# Patient Record
Sex: FEMALE | Race: WHITE | Employment: PART TIME | URBAN - METROPOLITAN AREA
[De-identification: names, ages, dates, MRNs, and addresses within clinical notes are randomized per-mention and may not be internally consistent; named-entity substitution may affect disease eponyms.]

---

## 2019-10-10 ENCOUNTER — HOSPITAL ENCOUNTER (OUTPATIENT)
Dept: PHYSICAL THERAPY | Age: 64
Discharge: HOME | End: 2019-10-10
Payer: MEDICARE

## 2019-10-10 PROCEDURE — 97161 PT EVAL LOW COMPLEX 20 MIN: CPT | Performed by: PHYSICAL THERAPIST

## 2019-10-10 PROCEDURE — 97110 THERAPEUTIC EXERCISES: CPT | Performed by: PHYSICAL THERAPIST

## 2019-10-10 NOTE — PROGRESS NOTES
[facility] at [address] [place] [address] Drive: [phone]   Fax: [phone]  PLAN OF CARE / STATEMENT OF MEDICAL NECESSITY FOR PHYSICAL THERAPY SERVICES  Patient Name: Marely Webb : 1955   Medical   Diagnosis: Left hip pain [M25.552] Treatment Diagnosis: Left hip pain, low back pain, left foot pain   Onset Date: chronic     Referral Source: Debride, Beryle Kurk, FNP Start of Care [facility]): 10/10/2019   Prior Hospitalization: See medical history Provider #: [id number]   Prior Level of Function: Gardening, dog walking, independent w/ ADLs   Comorbidities: OA, asthma, hypothyroidism   Medications: Verified on Patient Summary List   The Plan of Care and following information is based on the information from the initial evaluation.   ===========================================================================================  Assessment / garcia information:  Marely Webb is a 71 y.o.  yo female presents with signs/symptosm consistent with low back pain and muscular guarding and spasm. Responds well to repeated movement for reduction in symptoms     Patient will continue to benefit from skilled PT services to modify and progress therapeutic interventions, address functional mobility deficits, address ROM deficits, address strength deficits, analyze and address soft tissue restrictions, analyze and cue movement patterns, analyze and modify body mechanics/ergonomics and assess and modify postural abnormalities to attain remaining goals. .    Pt instructed in HEP and will f/u in clinic for PT.  ===========================================================================================  Eval Complexity: History HIGH Complexity :3+ comorbidities / personal factors will impact the outcome/ POC ;  Examination  MEDIUM Complexity : 3 Standardized tests and measures addressing body structure, function, activity limitation and / or participation in recreation ; Presentation LOW Complexity : Stable, uncomplicated ;  Decision Making MEDIUM Complexity : FOTO score of 26-74; Overall Complexity LOW   Problem List: pain affecting function, decrease ROM, decrease strength, edema affecting function, decrease ADL/ functional abilitiies, decrease activity tolerance, decrease flexibility/ joint mobility and decrease transfer abilities   Treatment Plan may include any combination of the following: Therapeutic exercise, Therapeutic activities, Neuromuscular re-education, Physical agent/modality, Gait/balance training, Manual therapy, Patient education, Self Care training and Functional mobility training  Patient / Family readiness to learn indicated by: asking questions, trying to perform skills and interest  Persons(s) to be included in education: patient (P)  Barriers to Learning/Limitations: no  Measures Taken: NA  Patient Goal (s): Pain relief   Patient self reported health status: good  Rehabilitation Potential: fair  Goals:  Short Term Goals: To be accomplished in 2 weeks:   Patient will report compliance with HEP 1x/day to aid in rehabilitation program.   Status at IE: NA   Current: Same as IE      Patient will increase lumbar ROM to 100 in all planes to aid in completion of ADLs. Status at IE:75%   Current: Same as IE    Long Term Goals: To be accomplished in 4 weeks:   Patient will increase B LE strength to 5/5 MMT throughout to aid in completion of ADLs. Status at IE:4/5   Current: Same as IE     Patient will report pain no greater than 4/10 throughout entire day to aid in completion of ADLs. Status at IE:4-10/10   Current: Same as IE     Patent will be able to sit for 30mins to be able to drive to appointments and complete ADLs. Status at [facility] with sitting for more than several mins   Current: Same as IE     Patient will improve FOTO score to 67 points to demonstrate improvement in functional status.    Status at IE:58   Current: Same as IE      Frequency / Duration:   Patient to be seen 2  times per week for 6  weeks:  Patient / Caregiver education and instruction: self care and exercises      . Therapist Signature: Esperanza Ganser, DPT, CRISTIAN Date: 12/94/2785   Certification Period: 10/10/2019 - 12/5/2019 Time: 9:16 AM   ===========================================================================================  I certify that the above Physical Therapy Services are being furnished while the patient is under my care. I agree with the treatment plan and certify that this therapy is necessary. Physician Signature:        Date:       Time:     Please sign and return to [facility] or you may fax the signed copy to [phone]. Thank you.

## 2019-10-10 NOTE — PROGRESS NOTES
PT DAILY TREATMENT NOTE/LUMBAR EVAL 10-18    Patient Name: Silas Vera  Date:10/10/2019  : 1955  [x]  Patient  Verified  Payor: [place] MEDICARE / Plan: VA MEDICARE PART A & B / Product Type: Medicare /    In time:8:00  Out time:9:00  Total Treatment Time (min): 60  Visit #: 1 of 12    Medicare/BCBS Only   Total Timed Codes (min):  30 1:1 Treatment Time:  60     Treatment Area: Low back pain [M54.5]  SUBJECTIVE  Pain Level (0-10 scale): 4  []constant []intermittent []improving []worsening []no change since onset    Any medication changes, allergies to medications, adverse drug reactions, diagnosis change, or new procedure performed?: [x] No    [] Yes (see summary sheet for update)  Subjective functional status/changes:     Patient has c/c of left hip pain since this year, along with left foot pain. Feels like it could be related to her scoliosis. MELODY: uncertain. Patient describes pain as shooting, and stiff. No diurnal features. Denies numbness/tingling. Denies popping/clicking. Aggravating factors: uncertain. Alleviating factors: uncertain. Denies red flags: SOB, chest pain, dizziness/lightheadedness, blurred/double vision, HA, chills/fevers, night sweats, change in bowel/bladder control, abdominal pain, difficulty swallowing, slurred speech, unexplained weight gain/loss, nausea, vomiting. PMHx: OA, asthma, scoliosis, hypothyroidism. Surgical Hx: right SAD/DCR/biceps tenodesis. Social Hx: 3 VICENTE home, denies alcohol &tobacco, work status:; lifting books to [employer] PLOF: gardening, dogs, reading, walking. CLOF: same. Diagnostic Imaging: X-ray: unremarkable in left hip and left foot.        OBJECTIVE/EXAMINATION     min [x]Eval                  []Re-Eval       30 min Therapeutic Exercise:  [] See flow sheet :   Rationale: increase ROM, increase strength and decrease pain to improve the patients ability to complete ADLs            With   [] TE   [] TA   [] neuro   [] other: Patient Education: [x] Review HEP    [] Progressed/Changed HEP based on:   [] positioning   [] body mechanics   [] transfers   [] heat/ice application    [] other:      Physical Therapy Evaluation - Lumbar Spine  :    OBJECTIVE  Posture:  Lateral Shift: [] R    [] L     [] +  [x] -  Kyphosis: [] Increased [] Decreased   [x]  WNL  Lordosis:  [] Increased [] Decreased   [x] WNL  Pelvic symmetry: [x] WNL    [] Other:    Gait:  [] Normal     [] Abnormal:    Active Movements: [] N/A   [] Too acute   [] Other:  ROM % AROM Comments:pain, area   Forward flexion 40-60 100    Extension 20-30 75    SB right 20-30 75    SB left 20-30 75    Rotation right 5-10 75    Rotation left 5-10 75      Repeated Movements   Effects on present pain: produces (NM), abolishes (A), increases (incr), decreases (decr), centralizes (C), peripheral (PH), no effect (NE)   Pre-Test Sx Flexion Repeated Flexion Extension Repeated Extension Repeated SBL Repeated SBR   Sitting          Standing none NE NE NE NE     Lying      N/A N/A   Comments:some pain during transitions, but no residual increase in symptoms  Side [place]: NE  Sustained passive positioning test:    Neuro Screen [] WNL  Myotome/Dermatome/Reflexes:  Comments:    Dural Mobility:  SLR Supine: [] R    [] L    [] +    [x] -  @ (degrees):   Slump Test: [] R    [] L    [] +    [x] -  @ (degrees):   Prone Knee Bend: [] R    [] L    [] +    [] -     Palpation  [] Min  [] Mod  [] Severe    Location:  [] Min  [] Mod  [] Severe    Location:    Strength   L(0-5) R (0-5) N/T   Hip Flexion (L1,2) 4 4 []   Knee Extension (L3,4) 5 4p! []   Ankle Dorsiflexion (L4) 4 5 []   Great Toe Extension (L5)   []   Ankle Plantarflexion (S1)   []   Knee Flexion (S1,2) 4+ 4+ []   Abdominals 4 4 []   Paraspinals 4 44 []   Back Rotators 4 4 []   Gluteus Andre   []   Hip Abduction 4 4+ []         Special Tests  Lumbar:  Lumb.  Compression: [] Pos  [] Neg               Lumbar Distraction:   [] Pos  [] Neg    Quadrant:  [] Pos  [] Neg   [] Flex  [] Ext    Sacroilliac:  Gaenslen's: [] R    [] L    [] +    [x] -     Compression: [] +    [] -     Gapping:  [] +    [] -     Thigh Thrust: [] R    [] L    [] +    [x] -            Hip: Macdonald Amparo:  [] R    [] L    [] +    [x] -     Scour:  [] R    [] L    [] +    [] -     Piriformis: [] R    [] L    [] +    [] -     Other tests/comments:  Pain reduced with repeated hip shifts in supine and repeated supine<>sit       Pain Level (0-10 scale) post treatment: 2    ASSESSMENT/Changes in Function: Patient presents with signs/symptoms consistent with low back pain and muscular guarding and spasm. Responds well to repeated movement for reduction in symptoms    Patient will continue to benefit from skilled PT services to modify and progress therapeutic interventions, address functional mobility deficits, address ROM deficits, address strength deficits, analyze and address soft tissue restrictions, analyze and cue movement patterns, analyze and modify body mechanics/ergonomics and assess and modify postural abnormalities to attain remaining goals.      [x]  See Plan of Care  []  See progress note/recertification  []  See Discharge Summary         Progress towards goals / Updated goals:  See POC    PLAN  []  Upgrade activities as tolerated     [x]  Continue plan of care  []  Update interventions per flow sheet       []  Discharge due to:_  []  Other:_      Galina Betancourt PT, DPT 10/10/2019  8:12 AM

## 2019-10-17 ENCOUNTER — HOSPITAL ENCOUNTER (OUTPATIENT)
Dept: PHYSICAL THERAPY | Age: 64
Discharge: HOME | End: 2019-10-17
Payer: MEDICARE

## 2019-10-17 PROCEDURE — 97110 THERAPEUTIC EXERCISES: CPT | Performed by: PHYSICAL THERAPIST

## 2019-10-17 NOTE — PROGRESS NOTES
PT DAILY TREATMENT NOTE    Patient Name: Liss Kramer  Date:10/17/2019  : 1955  [x]  Patient  Verified  Payor: Sherren Dallas / Plan: VA MEDICARE PART A & B / Product Type: Medicare /    In time:1:30  Out time:2:02  Total Treatment Time (min): 32  Total Timed Codes (min): 32  1:1 Treatment Time ( only): 32   Visit #: 2 of 12    Treatment Area: Left hip pain [M25.552]  Low back pain [M54.5]  Left foot pain [M79.672]    SUBJECTIVE  Pain Level (0-10 scale): 0  Any medication changes, allergies to medications, adverse drug reactions, diagnosis change, or new procedure performed?: [x] No    [] Yes (see summary sheet for update)  Subjective functional status/changes:   [] No changes reported  Feels okay. The hip shifts have been intermittently helpful. OBJECTIVE      32 min Therapeutic Exercise:  [x] See flow sheet :   Rationale: increase ROM, increase strength and decrease pain to improve the patients ability to complete ADLs    With   [] TE   [] TA   [] neuro   [] other: Patient Education: [x] Review HEP    [] Progressed/Changed HEP based on:   [] positioning   [] body mechanics   [] transfers   [] heat/ice application    [] other:      Other Objective/Functional Measures: NA     Pain Level (0-10 scale) post treatment: 0    ASSESSMENT/Changes in Function: Patient responds well to treatment session. Patient is progressing well. Requires VC for correct form with hip shifts.  . No adverse effects were noted from today's treatment session       Patient will continue to benefit from skilled PT services to modify and progress therapeutic interventions, address functional mobility deficits, address ROM deficits, address strength deficits, analyze and address soft tissue restrictions, analyze and cue movement patterns, analyze and modify body mechanics/ergonomics, assess and modify postural abnormalities    []  See Plan of Care  []  See progress note/recertification  []  See Discharge Summary         Progress towards goals / Updated goals:  Short Term Goals: To be accomplished in 2 weeks:                   Patient will report compliance with HEP 1x/day to aid in rehabilitation program.                   Status at IE: NA                   Current: Reports compliance, 10//17/2019                      Patient will increase lumbar ROM to 100 in all planes to aid in completion of ADLs. Status at IE:75%                   Current: Same as IE     Long Term Goals: To be accomplished in 4 weeks:                   Patient will increase B LE strength to 5/5 MMT throughout to aid in completion of ADLs. Status at IE:4/5                   Current: Same as IE                      Patient will report pain no greater than 4/10 throughout entire day to aid in completion of ADLs. Status at IE:4-10/10                   Current: Same as IE                      Patent will be able to sit for 30mins to be able to drive to appointments and complete ADLs. Status at [address] [place] [address] with sitting for more than several mins                   Current: Same as IE                      Patient will improve FOTO score to 67 points to demonstrate improvement in functional status.                    Status at IE:58                   Current: Same as IE    PLAN  []  Upgrade activities as tolerated     [x]  Continue plan of care  []  Update interventions per flow sheet       []  Discharge due to:_  []  Other:_      Dakota Clayton PT, DPT 10/17/2019  1:42 PM    Future Appointments   Date Time Provider Shruti Sarmiento   10/18/2019  2:00 PM AUGUSTUS Fierro [facility]   10/22/2019  9:30 AM AUGUSTUS Fierro [facility]   10/29/2019  3:00 PM Luanne Fierro [facility]   11/1/2019  3:00 PM Josie Gibbons PT, DPT [facility]   11/4/2019  3:00 PM Josie Gibbons PT, NAVID GONZALEZHPTDESI [facility]   11/7/2019  3:30 PM Josie Gibbons PT, MARYT [facility]

## 2019-10-18 ENCOUNTER — HOSPITAL ENCOUNTER (OUTPATIENT)
Dept: PHYSICAL THERAPY | Age: 64
Discharge: HOME | End: 2019-10-18
Payer: MEDICARE

## 2019-10-18 PROCEDURE — 97110 THERAPEUTIC EXERCISES: CPT

## 2019-10-18 NOTE — PROGRESS NOTES
PT DAILY TREATMENT NOTE    Patient Name: Marquis Dove  Date:10/18/2019  : 1955  [x]  Patient  Verified  Payor: Gustabo Gonzalez / Plan: VA MEDICARE PART A & B / Product Type: Medicare /    In time: 155  Out time: 235  Total Treatment Time (min): 40  Total Timed Codes (min): 40  1:1 Treatment Time (MC only): 40   Visit #: 3 of 12    Treatment Area: Left hip pain [M25.552]  Low back pain [M54.5]  Left foot pain [M79.672]    SUBJECTIVE  Pain Level (0-10 scale): 3  Any medication changes, allergies to medications, adverse drug reactions, diagnosis change, or new procedure performed?: [x] No    [] Yes (see summary sheet for update)  Subjective functional status/changes:   [] No changes reported  \"I pretty much always have pain, but it is pretty mild today. \"    OBJECTIVE    40 min Therapeutic Exercise:  [x] See flow sheet :   Rationale: increase ROM, increase strength and decrease pain to improve the patients ability to complete ADLs       With   [] TE   [] TA   [] neuro   [] other: Patient Education: [x] Review HEP    [] Progressed/Changed HEP based on:   [] positioning   [] body mechanics   [] transfers   [] heat/ice application    [] other:      Other Objective/Functional Measures: NA     Pain Level (0-10 scale) post treatment: 2    ASSESSMENT/Changes in Function: Patient responds well to treatment session. Patient with good tolerance for all exercises. Provided green theraband for use in HEP. . No adverse effects were noted from today's treatment session. Patient will continue to benefit from skilled PT services to modify and progress therapeutic interventions, address functional mobility deficits, address ROM deficits, address strength deficits, analyze and address soft tissue restrictions, analyze and cue movement patterns, analyze and modify body mechanics/ergonomics, assess and modify postural abnormalities,  and instruct in home and community integration to attain remaining goals.     []  See Plan of Care  []  See progress note/recertification  []  See Discharge Summary         Progress towards goals / Updated goals:  Short Term Goals: To be accomplished in 2 weeks:                   [id number] will report compliance with HEP 1x/day to aid in rehabilitation program.                   Status at IE: NA                   Current: Reports compliance, 10//17/2019                      Patient will increase lumbar ROM to 100 in all planes to aid in completion of ADLs.                  Status at IE:75%                   Current: Same as IE     Long Term Goals: To be accomplished in 4 weeks:                   Patient will increase B LE strength to 5/5 MMT throughout to aid in completion of ADLs.                  Status at IE:4/5                   Current: Same as IE                      Patient will report pain no greater than 4/10 throughout entire day to aid in completion of ADLs.                  Status at IE:4-10/10                   Current: Same as IE                      Patent will be able to sit for 30mins to be able to drive to appointments and complete ADLs.                  Status at [address] [place] [address] with sitting for more than several mins                   Current: Same as IE                      Patient will improve FOTO score to 67 points to demonstrate improvement in functional status.                    Status at IE:58                   Current: Same as IE    PLAN  []  Upgrade activities as tolerated     [x]  Continue plan of care  []  Update interventions per flow sheet       []  Discharge due to:_  []  Other:_      Araceli Higuera PT, DPT 10/18/2019  1:56 PM    Future Appointments   Date Time Provider Shruti Sarmiento   10/18/2019  2:00 PM AUGUSTUS Self [facility]   10/22/2019  9:30 AM AUGUSTUS Self [facility]   10/29/2019  3:00 PM Luanne Self [facility]   11/1/2019  3:00 PM Gamaliel Peterson, PT, DPT [facility]   11/4/2019  3:00 PM Gamaliel Peterson, PT, DPT [facility]   11/7/2019  3:30 PM Shweta Harrison Juan Espino, DPT [facility]

## 2019-10-22 ENCOUNTER — HOSPITAL ENCOUNTER (OUTPATIENT)
Dept: PHYSICAL THERAPY | Age: 64
Discharge: HOME | End: 2019-10-22
Payer: MEDICARE

## 2019-10-22 PROCEDURE — 97110 THERAPEUTIC EXERCISES: CPT | Performed by: PHYSICAL THERAPIST

## 2019-10-22 PROCEDURE — 97110 THERAPEUTIC EXERCISES: CPT

## 2019-10-22 PROCEDURE — 97161 PT EVAL LOW COMPLEX 20 MIN: CPT

## 2019-10-22 NOTE — PROGRESS NOTES
PT DAILY TREATMENT NOTE    Patient Name: Ena Smith  Date:10/22/2019  : 1955  [x]  Patient  Verified  Payor: Tamie Saul / Plan: VA MEDICARE PART A & B / Product Type: Medicare /    In time: 672  Out time: 1010  Total Treatment Time (min): 45  Total Timed Codes (min): 40  1:1 Treatment Time ( only): 40   Visit #: 4 of 12    Treatment Area: Left hip pain [M25.552]  Low back pain [M54.5]  Left foot pain [M79.672]    SUBJECTIVE  Pain Level (0-10 scale): 1  Any medication changes, allergies to medications, adverse drug reactions, diagnosis change, or new procedure performed?: [x] No    [] Yes (see summary sheet for update)  Subjective functional status/changes:   [] No changes reported  \"I am feeling better. The pain is coming down and I can do more around the house. \"    OBJECTIVE    40 min Therapeutic Exercise:  [x] See flow sheet :   Rationale: increase ROM, increase strength and decrease pain to improve the patients ability to complete ADLs     With   [] TE   [] TA   [] neuro   [] other: Patient Education: [x] Review HEP    [] Progressed/Changed HEP based on:   [] positioning   [] body mechanics   [] transfers   [] heat/ice application    [] other:      Other Objective/Functional Measures: NA     Pain Level (0-10 scale) post treatment: 0-1    ASSESSMENT/Changes in Function: Patient responds well to treatment session. Patient noting improved tolerance for activities at home with reduction in pain intensity. No adverse effects were noted from today's treatment session.      Patient will continue to benefit from skilled PT services to modify and progress therapeutic interventions, address functional mobility deficits, address ROM deficits, address strength deficits, analyze and address soft tissue restrictions, analyze and cue movement patterns, analyze and modify body mechanics/ergonomics, assess and modify postural abnormalities,  and instruct in home and community integration to attain remaining goals.    []  See Plan of Care  []  See progress note/recertification  []  See Discharge Summary         Progress towards goals / Updated goals:  Short Term Goals: To be accomplished in 2 weeks:                   VZGSYUK will report compliance with HEP 1x/day to aid in rehabilitation program.                   Status at IE: NA                   Current: Reports compliance, 10//17/2019                      Patient will increase lumbar ROM to 100 in all planes to aid in completion of ADLs.                  Status at IE:75%                   Current: Same as IE     Long Term Goals: To be accomplished in 4 weeks:                   Patient will increase B LE strength to 5/5 MMT throughout to aid in completion of ADLs.                  Status at IE:4/5                   Current: Same as IE                      Patient will report pain no greater than 4/10 throughout entire day to aid in completion of ADLs.                  Status at IE:4-10/10                   Current: Pain currently 1-4/10 past day  10/22/19                      Patent will be able to sit for 30mins to be able to drive to appointments and complete ADLs.                  Status at [facility] with sitting for more than several mins                   Current: Same as IE                      Patient will improve FOTO score to 67 points to demonstrate improvement in functional status.                    Status at IE:58                   Current: Same as IE    PLAN  []  Upgrade activities as tolerated     [x]  Continue plan of care  []  Update interventions per flow sheet       []  Discharge due to:_  []  Other:_      Arcadio Kate PT, DPT 10/22/2019  9:37 AM    Future Appointments   Date Time Provider Shruti Sarmiento   10/29/2019  3:00 PM Luanne Temple [facility]   11/1/2019  3:00 PM Meredith Nicole PT, DPT [facility]   11/4/2019  3:00 PM Meredith Nicole PT, DPT [facility]   11/7/2019  3:30 PM Meredith Nicole PT, DPT [facility]

## 2019-10-24 ENCOUNTER — APPOINTMENT (OUTPATIENT)
Dept: PHYSICAL THERAPY | Age: 64
End: 2019-10-24
Payer: MEDICARE

## 2019-10-29 ENCOUNTER — HOSPITAL ENCOUNTER (OUTPATIENT)
Dept: PHYSICAL THERAPY | Age: 64
Discharge: HOME | End: 2019-10-29
Payer: MEDICARE

## 2019-10-29 PROCEDURE — 97110 THERAPEUTIC EXERCISES: CPT

## 2019-10-29 NOTE — PROGRESS NOTES
PT DAILY TREATMENT NOTE    Patient Name: Camacho Clayton  Date:10/29/2019  : 1955  [x]  Patient  Verified  Payor: Rainer Zelaya / Plan: [place] & [place] / Product Type: Medicare /    In time: 255  Out time: 337  Total Treatment Time (min): 42  Total Timed Codes (min): 38  1:1 Treatment Time ( only): 38   Visit #: 5 of 12    Treatment Area: Left hip pain [M25.552]  Low back pain [M54.5]  Left foot pain [M79.672]    SUBJECTIVE  Pain Level (0-10 scale): 3  Any medication changes, allergies to medications, adverse drug reactions, diagnosis change, or new procedure performed?: [x] No    [] Yes (see summary sheet for update)  Subjective functional status/changes:   [] No changes reported  \"The pain seems to jump around, but not go away. In the morning, hurts in the hip, and when hip pain goes away, it hurts in the back. \"    OBJECTIVE    38 min Therapeutic Exercise:  [x] See flow sheet :   Rationale: increase ROM, increase strength and decrease pain to improve the patients ability to complete ADLs    With   [] TE   [] TA   [] neuro   [] other: Patient Education: [x] Review HEP    [] Progressed/Changed HEP based on:   [] positioning   [] body mechanics   [] transfers   [] heat/ice application    [] other:      Other Objective/Functional Measures: NA     Pain Level (0-10 scale) post treatment: 2    ASSESSMENT/Changes in Function: Patient responds well to treatment session. Tolerating gradual increase in exercise intensity without any increase in symptoms. No adverse effects were noted from today's treatment session.      Patient will continue to benefit from skilled PT services to modify and progress therapeutic interventions, address functional mobility deficits, address ROM deficits, address strength deficits, analyze and address soft tissue restrictions, analyze and cue movement patterns, analyze and modify body mechanics/ergonomics, assess and modify postural abnormalities,  and instruct in home and community integration to attain remaining goals. []  See Plan of Care  []  See progress note/recertification  []  See Discharge Summary         Progress towards goals / Updated goals:  Short Term Goals: To be accomplished in 2 weeks:                   [employer] will report compliance with HEP 1x/day to aid in rehabilitation program.                   Status at IE: NA                   Current: Reports compliance, 10//17/2019                      Patient will increase lumbar ROM to 100 in all planes to aid in completion of ADLs.                  Status at IE:75%                   Current: Same as IE     Long Term Goals: To be accomplished in 4 weeks:                   Patient will increase B LE strength to 5/5 MMT throughout to aid in completion of ADLs.                  Status at IE:4/5                   Current: Same as IE                      Patient will report pain no greater than 4/10 throughout entire day to aid in completion of ADLs.                  Status at IE:4-10/10                   Current: Pain currently 1-4/10 past day  10/22/19                      Patent will be able to sit for 30mins to be able to drive to appointments and complete ADLs.                  Status at IE: pain with sitting for more than several mins                   Current: Patient reports sitting tolerance improved to 10-15 mins. 10/29/19                      Patient will improve FOTO score to 67 points to demonstrate improvement in functional status.                    Status at IE:58                   Current: Same as IE    PLAN  []  Upgrade activities as tolerated     [x]  Continue plan of care  []  Update interventions per flow sheet       []  Discharge due to:_  []  Other:_      Leonard Daniel PT, DPT 10/29/2019  2:57 PM    Future Appointments   Date Time Provider Shruti Sarmiento   10/29/2019  3:00 PM Luanne Milligan [facility]   11/1/2019  3:00 PM Jaz Maxwell, PT, DPT [facility]    11/4/2019  3:00 PM Jaz Maxwell PT, NAVID [facility]   11/7/2019  3:30 PM AUGUSTUS RezaTDESI THE [facility]

## 2019-11-01 ENCOUNTER — HOSPITAL ENCOUNTER (OUTPATIENT)
Dept: PHYSICAL THERAPY | Age: 64
Discharge: HOME | End: 2019-11-01
Payer: MEDICARE

## 2019-11-01 PROCEDURE — 97110 THERAPEUTIC EXERCISES: CPT | Performed by: PHYSICAL THERAPIST

## 2019-11-01 NOTE — PROGRESS NOTES
PT DAILY TREATMENT NOTE    Patient Name: Rosalba Castillo  Date:2019  : 1955  [x]  Patient  Verified  Payor: [employer] / Plan: [place] MEDICARE PART A & B / Product Type: Medicare /    In time:3:00  Out time:3;43  Total Treatment Time (min): 43  Total Timed Codes (min): 43  1:1 Treatment Time ( [address] only): 37   Visit #: 6 of 12    Treatment Area: Left hip pain [M25.552]  Left foot pain [M79.672]  Low back pain [M54.5]    SUBJECTIVE  Pain Level (0-10 scale): 3  Any medication changes, allergies to medications, adverse drug reactions, diagnosis change, or new procedure performed?: [x] No    [] Yes (see summary sheet for update)  Subjective functional status/changes:   [] No changes reported  Feels alright. Some days are good. Some days are bad    OBJECTIVE    43 min Therapeutic Exercise:  [x] See flow sheet :   Rationale: increase ROM, increase strength and decrease pain to improve the patients ability to complete ADLs    With   [] TE   [] TA   [] neuro   [] other: Patient Education: [x] Review HEP    [] Progressed/Changed HEP based on:   [] positioning   [] body mechanics   [] transfers   [] heat/ice application    [] other:      Other Objective/Functional Measures: NA     Pain Level (0-10 scale) post treatment: 3    ASSESSMENT/Changes in Function: Patient responds well to treatment session. Patient appears to be progressing well. Minimal difficulty with exercises as prescribed.  No adverse effects were noted from today's treatment session       Patient will continue to benefit from skilled PT services to modify and progress therapeutic interventions, address functional mobility deficits, address ROM deficits, address strength deficits, analyze and address soft tissue restrictions, analyze and cue movement patterns, analyze and modify body mechanics/ergonomics, assess and modify postural abnormalities,    []  See Plan of Care  []  See progress note/recertification  []  See Discharge Summary Progress towards goals / Updated goals:  Short Term Goals: To be accomplished in 2 weeks:                   XLNNVLQ will report compliance with HEP 1x/day to aid in rehabilitation program.                   Status at IE: NA                   Current: Reports compliance, 10//17/2019                      Patient will increase lumbar ROM to 100 in all planes to aid in completion of ADLs.                  Status at IE:75%                   Current: ROM at 75%, progressing 11/1/2019     Long Term Goals: To be accomplished in 4 weeks:                   Patient will increase B LE strength to 5/5 MMT throughout to aid in completion of ADLs.                  Status at IE:4/5                   Current: Same as IE                      Patient will report pain no greater than 4/10 throughout entire day to aid in completion of ADLs.                  Status at IE:4-10/10                   Current: Pain currently 1-4/10 past day  10/22/19                      Patent will be able to sit for 30mins to be able to drive to appointments and complete ADLs.                  Status at IE: pain with sitting for more than several mins                   Current: Patient reports sitting tolerance improved to 10-15 mins. 10/29/19                      Patient will improve FOTO score to 67 points to demonstrate improvement in functional status.                    Status at IE:58                   Current: Same as IE    PLAN  []  Upgrade activities as tolerated     [x]  Continue plan of care  []  Update interventions per flow sheet       []  Discharge due to:_  []  Other:_      Dakota Clayton PT, DPT 11/1/2019  3:07 PM    Future Appointments   Date Time Provider Shruti Sarmiento   11/4/2019  3:00 PM Josie Gibbons PT, DPT [facility]   11/7/2019  3:30 PM Solitario Rangel [facility]

## 2019-11-04 ENCOUNTER — HOSPITAL ENCOUNTER (OUTPATIENT)
Dept: PHYSICAL THERAPY | Age: 64
Discharge: HOME | End: 2019-11-04
Payer: MEDICARE

## 2019-11-04 PROCEDURE — 97110 THERAPEUTIC EXERCISES: CPT | Performed by: PHYSICAL THERAPIST

## 2019-11-04 NOTE — PROGRESS NOTES
PT DAILY TREATMENT NOTE    Patient Name: Gita Lea  Date:2019  : 1955  [x]  Patient  Verified  Payor: [place] MEDICARE / Plan: [place] MEDICARE PART A & B / Product Type: Medicare /    In time:2:55  Out time:3:39  Total Treatment Time (min): 44  Total Timed Codes (min): 44  1:1 Treatment Time ( [address] only): 25   Visit #: 7 of 12    Treatment Area: Left hip pain [M25.552]  Left foot pain [M79.672]  Low back pain [M54.5]    SUBJECTIVE  Pain Level (0-10 scale): 3  Any medication changes, allergies to medications, adverse drug reactions, diagnosis change, or new procedure performed?: [x] No    [] Yes (see summary sheet for update)  Subjective functional status/changes:   [] No changes reported  Feels okay today. No new issues. OBJECTIVE    25 min Therapeutic Exercise:  [x] See flow sheet :   Rationale: increase ROM, increase strength and decrease pain to improve the patients ability to complete ADLs    With   [] TE   [] TA   [] neuro   [] other: Patient Education: [x] Review HEP    [] Progressed/Changed HEP based on:   [] positioning   [] body mechanics   [] transfers   [] heat/ice application    [] other:      Other Objective/Functional Measures: NA     Pain Level (0-10 scale) post treatment: 3    ASSESSMENT/Changes in Function: Patient responds well to treatment session. Patient will f/u next visit. Then again in two weeks after trial for HEP. No adverse effects were noted from today's treatment session.      Patient will continue to benefit from skilled PT services to modify and progress therapeutic interventions, address functional mobility deficits, address ROM deficits, address strength deficits, analyze and address soft tissue restrictions, analyze and cue movement patterns, analyze and modify body mechanics/ergonomics, assess and modify postural abnormalities    []  See Plan of Care  []  See progress note/recertification  []  See Discharge Summary         Progress towards goals / Updated goals:  Short Term Goals: To be accomplished in 2 weeks:                   [employer] will report compliance with HEP 1x/day to aid in rehabilitation program.                   Status at IE: NA                   Current: Reports compliance, 10//17/2019                      Patient will increase lumbar ROM to 100 in all planes to aid in completion of ADLs.                  Status at IE:75%                   Current: ROM at 75%, progressing 11/1/2019     Long Term Goals: To be accomplished in 4 weeks:                   Patient will increase B LE strength to 5/5 MMT throughout to aid in completion of ADLs.                  Status at IE:4/5                   Current: Same as IE                      Patient will report pain no greater than 4/10 throughout entire day to aid in completion of ADLs.                  Status at IE:4-10/10                   Current: Pain currently 1-4/10 past day  10/22/19                      Patent will be able to sit for 30mins to be able to drive to appointments and complete ADLs.                  Status at IE: pain with sitting for more than several mins                   Current: Patient reports sitting tolerance improved to 10-15 mins.  10/29/19                      Patient will improve FOTO score to 67 points to demonstrate improvement in functional status.                    Status at IE:58                   Current: Same as IE    PLAN  []  Upgrade activities as tolerated     [x]  Continue plan of care  []  Update interventions per flow sheet       []  Discharge due to:_  []  Other:_      Marco Antonio Aguilera PT, DPT 11/4/2019  3:23 PM    Future Appointments   Date Time Provider Shruti Sarmiento   11/7/2019  3:30 PM Michael Benton, PT [facility]

## 2019-11-07 ENCOUNTER — HOSPITAL ENCOUNTER (OUTPATIENT)
Dept: PHYSICAL THERAPY | Age: 64
Discharge: HOME | End: 2019-11-07
Payer: MEDICARE

## 2019-11-07 PROCEDURE — 97164 PT RE-EVAL EST PLAN CARE: CPT | Performed by: PHYSICAL THERAPIST

## 2019-11-07 PROCEDURE — 97110 THERAPEUTIC EXERCISES: CPT | Performed by: PHYSICAL THERAPIST

## 2019-11-07 NOTE — PROGRESS NOTES
[facility] [place], [address] [place] [address]  Phone: [phone]   Fax: [phone]    Medicare Progress Report      Patient name: Clifford Chapa     Start of Care: 10/10/2019    Referral source: Mireille Wolf FNP    : 1955  Medical/Treatment Diagnosis: Left hip pain [M25.552]  Left foot pain [M79.672]  Low back pain [M54.5]  Onset Date:Chronic   Prior Hospitalization: see medical history   Provider#: [id number]  Prior Level of Function: Gardening, dog walking, independent w/ ADLs   Comorbidities: OA, asthma, hypothyroidism   Medications: Verified on Patient Summary List    Visits from Start of Care: 8    Missed Visits: 0  Reporting Period : 10/10/2019  to 2019     Subjective Reports: pain fluxuates 0-5 /10     Short Term Goals: To be accomplished in 2 weeks:                   Patient will report compliance with HEP 1x/day to aid in rehabilitation program.                   Status at IE: NA                   Current: Reports compliance, MET                      Patient will increase lumbar ROM to 100 in all planes to aid in completion of ADLs.                  Status at IE:75%                   Current: ROM  Standing flexion fingers to toes , standing extension 10 deg initially improved to 17 deg after unilateral pressups, sitting rotation   54 deg to right , left 48 deg , progressing      Long Term Goals: To be accomplished in 4 weeks:                   Patient will increase B LE strength to 5/5 MMT throughout to aid in completion of ADLs.                  Status at IE:4/5                   Current: 4+ /5 hip flexion , quad 4+ right due to pain, 5/5 left , HS/DF ankle , Abd /ADD all 5/5 bilaterally , ER hip 4 to 4+/5 bilaterally 2019  Progressing                       Patient will report pain no greater than 4/10 throughout entire day to aid in completion of ADLs.                    Status at IE:4-10/10                   Current: Pain pt states usually no more than 5/10 unless catches \" not that often any more 4-5 x a years maybe )  , dissipates when up and moving around 11/7/2019                       Patent will be able to sit for 30mins to be able to drive to appointments and complete ADLs.                  Status at IE: pain with sitting for more than several mins                   Current: Patient reports sitting tolerance improved to 15-20 mins. If have to drive longer stiffer trying to get out of car ( doesn't hurt while driving )   09/8/62                      Patient will improve FOTO score to 67 points to demonstrate improvement in functional status.                  Status at IE:58                   Current:60/100( 2 point increase) progressing     Key functional changes: pt demonstrates understanding of all exin HEP  ,she  has handouts and blue tband to progress at home. Today noted Improved strength in LE  , improving ROM in her trunk but still very stiff into extension. She is noting improvement in her sitting tolerance and states she can drive 30 min with lumbar support without pain but is still really stiff when she goes to get out of the car . Problems/ barriers to goal attainment: pain still limit in mobility     Assessment / Recommendations:pt wishes to try HEP on her own  For a couple weeks then be reassessed for further needs. This is appropriate with her current level of understanding of her HEP     Problem List: pain affecting function, decrease ROM, decrease strength, decrease ADL/ functional abilitiies, decrease activity tolerance and decrease flexibility/ joint mobility   Treatment Plan: will reassess pt progress in 2 wks and progress plan as appropriate     Patient Goal (s) has been updated and includes: continuing to progress toward less pain     Frequency / Duration: Patient to be seen in 2 wks to reasses progress with HEP emphasis and any further therapy needs .      The severity rating is based on clinical judgement and the FOTO score. Continuing with original certification period :   Certification Period: 10/10/2019 - 12/5/2019         Martell Cornea, PT 11/7/2019 4:42 PM    ===========================================================================================  I certify that the above Physical Therapy Services are being furnished while the patient is under my care. I agree with the treatment plan and certify that this therapy is necessary.     Physician Signature:                                                         Date:                                    Time:                                 Please sign and return to [facility] [place] or you may fax the signed copy to [phone]. Thank you.

## 2019-11-07 NOTE — PROGRESS NOTES
PT DAILY TREATMENT NOTE    Patient Name: Horacio Oar  Date:2019  : 1955  [x]  Patient  Verified  Payor: Idolina Babinski / Plan: [place] MEDICARE PART A & B / Product Type: Medicare /    In time:1535  Out time:1632  Total Treatment Time (min): 57  Total Timed Codes (min): 47  1:1 Treatment Time ( only): 45   Visit #: 8 of 12    Treatment Area: Left hip pain [M25.552]  Left foot pain [M79.672]  Low back pain [M54.5]    SUBJECTIVE  Pain Level (0-10 scale):4 right hand side but on ly when up and moving      Any medication changes, allergies to medications, adverse drug reactions, diagnosis change, or new procedure performed?: [x] No    [] Yes (see summary sheet for update)  Subjective functional status/changes:   [x] No changes reported    OBJECTIVE      10 min []Eval                  [x]Re-Eval     47 min Therapeutic Exercise:  [x] See flow sheet :   Rationale: increase ROM, increase strength and decrease pain to improve the patients ability to complete ADLs             With   [] TE   [] TA   [] neuro   [] other: Patient Education: [x] Review HEP    [] Progressed/Changed HEP based on:   [] positioning   [] body mechanics   [] transfers   [] heat/ice application    [] other:      Other Objective/Functional Measures:   MMT:  4+ /5 hip flexion , quad 4+ right due to pain, 5/5 left , HS/DF ankle , Abd /ADD all 5/5 bilaterally , ER hip 4 to 4+/5 bilaterally      ROM  Standing flexion fingers to toes , standing extension 10 deg initially improved to 17 deg after unilateral pressups, sitting rotation   54 deg to right , left 48 deg ,    Pain Level (0-10 scale) post treatment: 1    ASSESSMENT/Changes in Function: pt demonstated understanding of all ex , has handout given blue tband to progress at home. Improved strength in LE noted today , improving ROM responded well to repeated mobility ex toward easier side today with unilateral pressups and sitting rotation to free up motion.  No adverse affects pt agrees wishes to continue on own with HEP and f/u in 2 weeks to check on progress and assess further needs . Patient will continue to benefit from skilled PT services to modify and progress therapeutic interventions, address functional mobility deficits, address ROM deficits, address strength deficits, analyze and address soft tissue restrictions, analyze and cue movement patterns, analyze and modify body mechanics/ergonomics and assess and modify postural abnormalities to attain remaining goals. []  See Plan of Care  [x]  See progress note/recertification  []  See Discharge Summary         Progress towards goals / Updated goals:  Short Term Goals: To be accomplished in 2 weeks:                   Patient will report compliance with HEP 1x/day to aid in rehabilitation program.                   Status at IE: NA                   Current: Reports compliance, MET                      Patient will increase lumbar ROM to 100 in all planes to aid in completion of ADLs.                  Status at IE:75%                   Current: ROM  Standing flexion fingers to toes , standing extension 10 deg initially improved to 17 deg after unilateral pressups, sitting rotation   54 deg to right , left 48 deg , progressing     Long Term Goals: To be accomplished in 4 weeks:                   Patient will increase B LE strength to 5/5 MMT throughout to aid in completion of ADLs.                  Status at IE:4/5                   Current: 4+ /5 hip flexion , quad 4+ right due to pain, 5/5 left , HS/DF ankle , Abd /ADD all 5/5 bilaterally , ER hip 4 to 4+/5 bilaterally 11/7/2019  Progressing                       Patient will report pain no greater than 4/10 throughout entire day to aid in completion of ADLs.                    Status at IE:4-10/10                   Current: Pain pt states usually no more than 5/10 unless catches \" not that often any more 4-5 x a years maybe )  , dissipates when up and moving around 11/7/2019                     Patent will be able to sit for 30mins to be able to drive to appointments and complete ADLs.                  Status at IE: pain with sitting for more than several mins                   Current: Patient reports sitting tolerance improved to 15-20 mins. If have to drive longer stiffer trying to get out of car ( doesn't hurt while driving )   82/3/20                      Patient will improve FOTO score to 67 points to demonstrate improvement in functional status.                    Status at IE:58                   Current:60/100( 2 point increase) progressing     PLAN  [x]  Upgrade activities as tolerated     [x]  Continue plan of care  []  Update interventions per flow sheet       []  Discharge due to:_  []  Other:_      Ariel Murillo PT 11/7/2019  4:00 PM    Future Appointments   Date Time Provider Shruti Sarmiento   11/20/2019  2:30 PM Gray Parikh, PT, DPT [facility]

## 2019-11-19 ENCOUNTER — APPOINTMENT (OUTPATIENT)
Dept: PHYSICAL THERAPY | Age: 64
End: 2019-11-19
Payer: MEDICARE

## 2019-11-20 ENCOUNTER — HOSPITAL ENCOUNTER (OUTPATIENT)
Dept: PHYSICAL THERAPY | Age: 64
Discharge: HOME | End: 2019-11-20
Payer: MEDICARE

## 2019-11-20 PROCEDURE — 97110 THERAPEUTIC EXERCISES: CPT | Performed by: PHYSICAL THERAPIST

## 2019-11-20 NOTE — PROGRESS NOTES
PT DAILY TREATMENT NOTE    Patient Name: iGnette Tobar  Date:2019  : 1955  [x]  Patient  Verified  Payor: Monique [employer] / Plan: [place] / Product Type: Medicare /    In time:2:35  Out time:3:09  Total Treatment Time (min): 34  Total Timed Codes (min): 34  1:1 Treatment Time ( [address] only): 26   Visit #: 9 of 12    Treatment Area: Left hip pain [M25.552]  Left foot pain [M79.672]  Low back pain [M54.5]    SUBJECTIVE  Pain Level (0-10 scale): 2  Any medication changes, allergies to medications, adverse drug reactions, diagnosis change, or new procedure performed?: [x] No    [] Yes (see summary sheet for update)  Subjective functional status/changes:   [] No changes reported  Feels good. No new issues. Feels ready to progress via independent HEP at this time. OBJECTIVE    26 min Therapeutic Exercise:  [x] See flow sheet :   Rationale: increase ROM, increase strength and decrease pain to improve the patients ability to complete ADLs        With   [] TE   [] TA   [] neuro   [] other: Patient Education: [x] Review HEP    [] Progressed/Changed HEP based on:   [] positioning   [] body mechanics   [] transfers   [] heat/ice application    [] other:      Other Objective/Functional Measures: MMT 5/5     Pain Level (0-10 scale) post treatment: 2    ASSESSMENT/Changes in Function: Patient responds well to treatment session. Patient has made good progress to date. Meeting most goals. She has maintained well over the past two weeks. Is capable of being progressed via independent HEP at this time.  No adverse effects were noted from today's treatment session    []  See Plan of Care  []  See progress note/recertification  []  See Discharge Summary         Progress towards goals / Updated goals:  Short Term Goals: To be accomplished in 2 weeks:                   Patient will report compliance with HEP 1x/day to aid in rehabilitation program.                   Status at IE: NA                   Current: Reports compliance, MET                      Patient will increase lumbar ROM to 100 in all planes to aid in completion of ADLs.                  Status at IE:75%                   Current: ROM  Standing flexion fingers to toes , standing extension 10 deg initially improved to 17 deg after unilateral pressups, sitting rotation   54 deg to right , left 48 deg , progressing      Long Term Goals: To be accomplished in 4 weeks:                   Patient will increase B LE strength to 5/5 MMT throughout to aid in completion of ADLs.                  Status at IE:4/5                   Current: 5/5, met 11/20/2019                      Patient will report pain no greater than 4/10 throughout entire day to aid in completion of ADLs.                  Status at IE:4-10/10                   Current: 2/10, Met 11/20/2019                      Patent will be able to sit for 30mins to be able to drive to appointments and complete ADLs.                  Status at IE: pain with sitting for more than several mins                   Current:sits for 20mins Not met 11/20/19                      Patient will improve FOTO score to 67 points to demonstrate improvement in functional status.                  Status at IE:58                   Current:69, Met 11/20/2019    PLAN  []  Upgrade activities as tolerated     []  Continue plan of care  []  Update interventions per flow sheet       [x]  Discharge due to:_  []  Other:_      Chino Pena, PT, DPT 11/20/2019  2:45 PM    No future appointments.

## 2019-11-20 NOTE — PROGRESS NOTES
[facility] at [address] [place] [address] Drive: [phone]   Fax: [phone]  Discharge Summary  Patient Name: Liss Kramer : 1955   Medical   Diagnosis: Left hip pain [M25.552]  Left foot pain [M79.672]  Low back pain [M54.5] Treatment Diagnosis: Left hip pain, low back pain   Onset Date: chronic     Referral Source: Ra Wolf FNP Start of Care [facility]): 10/10/2019   Prior Hospitalization: See medical history Provider #: [id number]   Prior Level of Function: Gardening, dog walking, independent w/ ADLs   Comorbidities: OA, asthma, hypothyroidism   Medications: Verified on Patient Summary List      ===========================================================================================  Assessment / Summary of Care:  Liss Kramer is a 71 y.o.  yo female with chronic low back pain. Patient has made good progress to date. Meeting most goals. She has maintained well over the past two weeks. Is capable of being progressed via independent HEP at this time. No adverse effects were noted from today's treatment session    ===========================================================================================    Plan: Discharge to independent HEP. Goals:   Short Term Goals: To be accomplished in 2 weeks:                   DFLQEGO will report compliance with HEP 1x/day to aid in rehabilitation program.                   Status at IE: NA                   Current: Reports compliance, MET                      Patient will increase lumbar ROM to 100 in all planes to aid in completion of ADLs.                    Status at IE:75%                   Current: ROM  Standing flexion fingers to toes , standing extension 10 deg initially improved to 17 deg after unilateral pressups, sitting rotation   54 deg to right , left 48 deg , progressing      Long Term Goals: To be accomplished in 4 weeks:                   Patient will increase B LE strength to 5/5 MMT throughout to aid in completion of ADLs.                  Status at IE:4/5                   Current: 5/5, met 11/20/2019                      Patient will report pain no greater than 4/10 throughout entire day to aid in completion of ADLs.                  Status at IE:4-10/10                   Current: 2/10, Met 11/20/2019                      Patent will be able to sit for 30mins to be able to drive to appointments and complete ADLs.                  Status at IE: pain with sitting for more than several mins                   Current:sits for 20mins Not met 11/20/19                      Patient will improve FOTO score to 67 points to demonstrate improvement in functional status.                  Status at IE:58                   Current:69, Met 11/20/2019    ===========================================================================================  Subjective: Feels good. No new issues. Feels ready to progress via independent HEP at this time.       Objective: MMT 5/5    Therapist Signature: Casey Childress PT, DPT, OCS Date: 11/20/2019     Time: 4:46 PM